# Patient Record
Sex: FEMALE | Race: BLACK OR AFRICAN AMERICAN | NOT HISPANIC OR LATINO | Employment: FULL TIME | URBAN - METROPOLITAN AREA
[De-identification: names, ages, dates, MRNs, and addresses within clinical notes are randomized per-mention and may not be internally consistent; named-entity substitution may affect disease eponyms.]

---

## 2023-09-22 DIAGNOSIS — N63.10 MASS OF RIGHT BREAST, UNSPECIFIED QUADRANT: Primary | ICD-10-CM

## 2023-09-28 ENCOUNTER — HOSPITAL ENCOUNTER (OUTPATIENT)
Dept: RADIOLOGY | Facility: HOSPITAL | Age: 33
Discharge: HOME | End: 2023-09-28
Attending: SURGERY
Payer: COMMERCIAL

## 2023-09-28 DIAGNOSIS — N63.10 MASS OF RIGHT BREAST, UNSPECIFIED QUADRANT: ICD-10-CM

## 2023-09-28 PROCEDURE — 76642 US BREAST RIGHT LIMITED: ICD-10-PCS | Mod: 26,RT,, | Performed by: STUDENT IN AN ORGANIZED HEALTH CARE EDUCATION/TRAINING PROGRAM

## 2023-09-28 PROCEDURE — 77061 BREAST TOMOSYNTHESIS UNI: CPT | Mod: 26,RT,, | Performed by: STUDENT IN AN ORGANIZED HEALTH CARE EDUCATION/TRAINING PROGRAM

## 2023-09-28 PROCEDURE — 77061 BREAST TOMOSYNTHESIS UNI: CPT | Mod: TC,RT

## 2023-09-28 PROCEDURE — 76642 ULTRASOUND BREAST LIMITED: CPT | Mod: 26,RT,, | Performed by: STUDENT IN AN ORGANIZED HEALTH CARE EDUCATION/TRAINING PROGRAM

## 2023-09-28 PROCEDURE — 77065 DX MAMMO INCL CAD UNI: CPT | Mod: 26,RT,, | Performed by: STUDENT IN AN ORGANIZED HEALTH CARE EDUCATION/TRAINING PROGRAM

## 2023-09-28 PROCEDURE — 77065 MAMMO DIGITAL DIAGNOSTIC RIGHT WITH TOMO: ICD-10-PCS | Mod: 26,RT,, | Performed by: STUDENT IN AN ORGANIZED HEALTH CARE EDUCATION/TRAINING PROGRAM

## 2023-09-28 PROCEDURE — 77061 MAMMO DIGITAL DIAGNOSTIC RIGHT WITH TOMO: ICD-10-PCS | Mod: 26,RT,, | Performed by: STUDENT IN AN ORGANIZED HEALTH CARE EDUCATION/TRAINING PROGRAM

## 2023-09-28 PROCEDURE — 76642 ULTRASOUND BREAST LIMITED: CPT | Mod: TC,RT

## 2023-10-05 PROBLEM — N63.15 UNSPECIFIED LUMP IN THE RIGHT BREAST, OVERLAPPING QUADRANTS: Status: ACTIVE | Noted: 2023-10-05

## 2023-10-06 ENCOUNTER — OFFICE VISIT (OUTPATIENT)
Dept: SURGERY | Facility: CLINIC | Age: 33
End: 2023-10-06
Payer: COMMERCIAL

## 2023-10-06 ENCOUNTER — TELEPHONE (OUTPATIENT)
Dept: SURGERY | Facility: CLINIC | Age: 33
End: 2023-10-06

## 2023-10-06 DIAGNOSIS — N63.0 MASS OF BREAST, UNSPECIFIED LATERALITY: ICD-10-CM

## 2023-10-06 DIAGNOSIS — N63.15 UNSPECIFIED LUMP IN THE RIGHT BREAST, OVERLAPPING QUADRANTS: ICD-10-CM

## 2023-10-06 PROCEDURE — 99999 PR PBB SHADOW E&M-EST. PATIENT-LVL II: ICD-10-PCS | Mod: PBBFAC,,, | Performed by: SURGERY

## 2023-10-06 PROCEDURE — 3044F PR MOST RECENT HEMOGLOBIN A1C LEVEL <7.0%: ICD-10-PCS | Mod: CPTII,S$GLB,, | Performed by: SURGERY

## 2023-10-06 PROCEDURE — 99203 PR OFFICE/OUTPT VISIT, NEW, LEVL III, 30-44 MIN: ICD-10-PCS | Mod: S$GLB,,, | Performed by: SURGERY

## 2023-10-06 PROCEDURE — 3044F HG A1C LEVEL LT 7.0%: CPT | Mod: CPTII,S$GLB,, | Performed by: SURGERY

## 2023-10-06 PROCEDURE — 99999 PR PBB SHADOW E&M-EST. PATIENT-LVL II: CPT | Mod: PBBFAC,,, | Performed by: SURGERY

## 2023-10-06 PROCEDURE — 99203 OFFICE O/P NEW LOW 30 MIN: CPT | Mod: S$GLB,,, | Performed by: SURGERY

## 2023-10-06 RX ORDER — DICLOXACILLIN SODIUM 500 MG/1
500 CAPSULE ORAL EVERY 6 HOURS
Qty: 40 CAPSULE | Refills: 0 | Status: SHIPPED | OUTPATIENT
Start: 2023-10-06 | End: 2023-10-16

## 2023-10-06 RX ORDER — CLINDAMYCIN HYDROCHLORIDE 300 MG/1
300 CAPSULE ORAL EVERY 6 HOURS
Qty: 28 CAPSULE | Refills: 0 | Status: SHIPPED | OUTPATIENT
Start: 2023-10-06 | End: 2023-10-13

## 2023-10-06 NOTE — TELEPHONE ENCOUNTER
Patient had called back to the office, and spoke with one of the phone operators to inform us that she was not able to  the Dicloxacillin that was sent in initially. I informed Dr Henry, and was told that she would send in Clindamycin for the patient. Phoned patient to make her aware, and informed to allow the pharmacy maybe an hour or so to fill. Voiced understanding.

## 2023-10-06 NOTE — TELEPHONE ENCOUNTER
Dr Henry ask that I reach out to [facility] [place] [facility] lact[facility] regarding the patient having mastitis. Patient was asked on exam by Dr Henry, if she has ever met with the lactation department, and the patient mentioned that she had the personal cellphone number of Patience Blas in that department. She mentioned that the called Patience on Wednesday of this week, but did not get an answer, and she was unable to leave a message. I was in the room with the patient, while she was on the phone with [facility]), and they mentioned to the patient to change her flange size; instead of pumping every 2 hours, change to pumping every 3 hours for 20 mins. Lactation was also informed by the patient that Dr Henry sent in a antibiotic to take every 6 hours for 10 days. Patient has been scheduled for a 2 week follow up with our office.

## 2023-10-06 NOTE — PROGRESS NOTES
Breast Surgical Oncology  [place]    Date of Service: 10/06/2023    SUBJECTIVE:   Chief complaint: right breast mass    HISTORY OF PRESENT ILLNESS:   Anabella Scott is a 33 y.o. female who is kindly referred by Dr. Leilani Olivarez for right breast mass.    She palpated multiple right breast abnormalities at her postpartum visit. She began to develop encouragement and diminished milk production to half an once per every 3 hour pumping of that right breast. Focused sonographic evaluation revealed an incidental heterogeneously hypoechoic mass measuring 4.4 x 2.3 cm at 9 o'clock 3 cm from the nipple separate from the reported palpable findings. Focused sonographic evaluation at the reported palpable sites showed dense breast tissue without evidence of mass. She denies breast concerns such as pain, nipple discharge, nipple retraction or lumps under the arm.  She denies prior breast surgery or biopsy.     Her breast cancer risk factor profile is as follows: Menarche at 16, Menopause at N/A.  She is . Age at first live birth was 33. Family history of cancer is as follows: mother with breast cancer at age 50, current age unknown; brother with bladder cancer at age 37, current age unknown.     FAMILY HISTORY:     Family History   Problem Relation Age of Onset    Hypertension Father     Hypertension Mother     Breast cancer Mother         PAST MEDICAL HISTORY:   No past medical history on file.    SURGICAL HISTORY:   No past surgical history on file.    SOCIAL HISTORY:     Social History     Tobacco Use    Smoking status: Never    Smokeless tobacco: Never   Substance Use Topics    Alcohol use: Not Currently    Drug use: Never        MEDICATIONS/ALLERGIES:     Current Outpatient Medications:     aspirin (ECOTRIN) 81 MG EC tablet, Take 81 mg by mouth once daily., Disp: , Rfl:     dicloxacillin (DYNAPEN) 500 MG capsule, Take 1 capsule (500 mg total) by mouth every 6 (six) hours. for 10 days, Disp: 40 capsule, Rfl: 0     estradioL (ESTRACE) 0.01 % (0.1 mg/gram) vaginal cream, Place 1 g vaginally once daily. (Patient not taking: Reported on 9/14/2023), Disp: 30 g, Rfl: 1    ferrous sulfate 325 (65 FE) MG EC tablet, Take 1 tablet (325 mg total) by mouth 2 (two) times daily. (Patient not taking: Reported on 9/14/2023), Disp: 60 tablet, Rfl: 10    prenatal 25/iron fum/folic/dha (PRENATAL-1 ORAL), Take by mouth., Disp: , Rfl:   Review of patient's allergies indicates:   Allergen Reactions    Fish containing products Hives       REVIEW OF SYSTEMS:   I have reviewed 12 systems, including 2 points per system. Pertinent reported positives are: night sweats    PHYSICAL EXAM:   General: The patient appears well and is in no acute distress.     Chaperon present for examination.   BREAST EXAM  No Asymmetry  Right:  - Mass: No, vague nodularity 5:00, 8 CMFN  - Skin change: acute mastitis with engorgement  - Nipple Discharge: No  - Nipple retraction: No  - Axillary LAD: No  Left:   - Mass: No  - Skin change: No  - Nipple Discharge: No  - Nipple retraction: No  - Axillary LAD: No    IMAGING:   Images were personally reviewed.     Results for orders placed during the hospital encounter of 09/28/23    Mammo Digital Diagnostic Right with Jose    Narrative  Result:  Mammo Digital Diagnostic Right with Jose  US Breast Right Limited    History:  Patient is 33 y.o. and is seen for diagnostic imaging for palpable lower right breast abnormalities in postpartum setting, currently breast feeding.    Films Compared:  None    Findings:  This procedure was performed using tomosynthesis. Computer-aided detection was utilized in the interpretation of this examination.  Mammo Digital Diagnostic Right with Jsoe  The right breast is extremely dense, which lowers the sensitivity of mammography. There is no evidence of suspicious masses, calcifications, or other abnormal findings in the right breast.    US Breast Right Limited  At site of patient's palpable concern  within the right breast at 5 o'clock 8 cm from the nipple, there is heterogeneously hypoechoic breast tissue without definite underlying mass.  No internal vascularity.    At site of patient's palpable concern within the right breast at 6 o'clock 8 cm from the nipple, there is similar heterogeneously hypoechoic breast tissue without definite underlying mass.  No internal vascularity.    Incidentally identified at 9 o'clock 3 cm from the nipple separate from sites of palpable concern, there is a heterogeneously hypoechoic mass measuring 4.4 x 2.3 cm.  Mass is smoothly marginated with parallel orientation and posterior enhancement, no internal vascularity.  Appearance of internal echos suggests complex fluid, possible complicated cyst.    Impression  Ill-defined hypoechoic tissue correlates with patient's palpable concerns in the lower right breast without definite associated mass, probably benign.    Incidental possible complicated cyst within the outer right breast, probably benign.    BI-RADS Category:  Overall: 3 - Probably Benign      Recommendation:  Short interval follow-up ultrasound is recommended in 3 months.  Follow-up may be obtained sooner if patient ceases breast feeding before that time or if growing palpable concern.      PATHOLOGY:   none    ASSESSMENT:     1. Mastitis, postpartum    2. Unspecified lump in the right breast, overlapping quadrants    3. Mass of breast, unspecified laterality          PLAN:     The cyst detected on recent sonographic evaluation was likely developing mastitis. She has acute right breast mastitis. She requires immediate evaluation by lactation services.  Diclocacillin PO was sent to her pharmacy.     She will return in 2 weeks for re-evaluation of her LIQ palpable abnormality once her acute mastitis has resolved.     I spent a total of 30 minutes on this visit. This includes face to face time and non-face to face time preparing to see the patient (eg, review of tests),  obtaining and/or reviewing separately obtained history, documenting clinical information in the electronic or other health record, independently interpreting results and communicating results to the patient/family/caregiver, or care coordinator.        Angie Henry M.D.

## 2023-10-09 ENCOUNTER — TELEPHONE (OUTPATIENT)
Dept: SURGERY | Facility: CLINIC | Age: 33
End: 2023-10-09
Payer: COMMERCIAL

## 2023-10-09 NOTE — TELEPHONE ENCOUNTER
Phoned patient, per Dr Henry, to follow up on 10/6 visit. Patient mentioned that she continues to have some intermittent pain, and she has started taking her antibiotics. I asked her if she followed the instructions of the lactation department at [facility], and she mentioned she did. Informed patient that I would reach out to the Lactation department to have them to call her. Patient informed to f/u if she had not received a phone call by tomorrow afternoon. Voiced understanding.

## 2023-10-11 ENCOUNTER — HOSPITAL ENCOUNTER (OUTPATIENT)
Facility: HOSPITAL | Age: 33
Discharge: HOME | End: 2023-10-11
Attending: SURGERY | Admitting: SURGERY
Payer: COMMERCIAL

## 2023-10-11 ENCOUNTER — CLINICAL SUPPORT (OUTPATIENT)
Dept: LACTATION | Facility: CLINIC | Age: 33
End: 2023-10-11
Payer: COMMERCIAL

## 2023-10-11 ENCOUNTER — ANESTHESIA (OUTPATIENT)
Dept: SURGERY | Facility: HOSPITAL | Age: 33
End: 2023-10-11
Payer: COMMERCIAL

## 2023-10-11 ENCOUNTER — ANESTHESIA EVENT (OUTPATIENT)
Dept: SURGERY | Facility: HOSPITAL | Age: 33
End: 2023-10-11
Payer: COMMERCIAL

## 2023-10-11 ENCOUNTER — OFFICE VISIT (OUTPATIENT)
Dept: SURGERY | Facility: CLINIC | Age: 33
End: 2023-10-11
Payer: COMMERCIAL

## 2023-10-11 VITALS
WEIGHT: 94.94 LBS | SYSTOLIC BLOOD PRESSURE: 122 MMHG | HEIGHT: 60 IN | BODY MASS INDEX: 18.64 KG/M2 | DIASTOLIC BLOOD PRESSURE: 76 MMHG | OXYGEN SATURATION: 98 % | TEMPERATURE: 97 F | HEART RATE: 88 BPM | RESPIRATION RATE: 20 BRPM

## 2023-10-11 VITALS — HEIGHT: 60 IN | BODY MASS INDEX: 18.74 KG/M2 | WEIGHT: 95.44 LBS

## 2023-10-11 DIAGNOSIS — R23.9 ALTERATION IN SKIN INTEGRITY RELATED TO SURGICAL INCISION: Primary | ICD-10-CM

## 2023-10-11 DIAGNOSIS — N63.15 UNSPECIFIED LUMP IN THE RIGHT BREAST, OVERLAPPING QUADRANTS: ICD-10-CM

## 2023-10-11 DIAGNOSIS — O91.13 ABSCESS OF RIGHT BREAST ASSOCIATED WITH LACTATION: Primary | ICD-10-CM

## 2023-10-11 DIAGNOSIS — L02.91 ABSCESS: Primary | ICD-10-CM

## 2023-10-11 DIAGNOSIS — N63.13 MASS OF LOWER OUTER QUADRANT OF RIGHT BREAST: ICD-10-CM

## 2023-10-11 DIAGNOSIS — R23.9 ALTERATION IN SKIN INTEGRITY RELATED TO SURGICAL INCISION: ICD-10-CM

## 2023-10-11 DIAGNOSIS — Z71.89 COUNSELING AND COORDINATION OF CARE: ICD-10-CM

## 2023-10-11 LAB
B-HCG UR QL: NEGATIVE
CTP QC/QA: YES

## 2023-10-11 PROCEDURE — 25000003 PHARM REV CODE 250: Performed by: NURSE ANESTHETIST, CERTIFIED REGISTERED

## 2023-10-11 PROCEDURE — 36000705 HC OR TIME LEV I EA ADD 15 MIN: Performed by: SURGERY

## 2023-10-11 PROCEDURE — 87075 CULTR BACTERIA EXCEPT BLOOD: CPT | Performed by: SURGERY

## 2023-10-11 PROCEDURE — 99203 OFFICE O/P NEW LOW 30 MIN: CPT | Mod: 57,S$GLB,, | Performed by: NURSE PRACTITIONER

## 2023-10-11 PROCEDURE — 99999 PR PBB SHADOW E&M-EST. PATIENT-LVL III: CPT | Mod: PBBFAC,,, | Performed by: NURSE PRACTITIONER

## 2023-10-11 PROCEDURE — 3008F PR BODY MASS INDEX (BMI) DOCUMENTED: ICD-10-PCS | Mod: CPTII,S$GLB,, | Performed by: NURSE PRACTITIONER

## 2023-10-11 PROCEDURE — 87077 CULTURE AEROBIC IDENTIFY: CPT | Performed by: SURGERY

## 2023-10-11 PROCEDURE — C1729 CATH, DRAINAGE: HCPCS | Performed by: SURGERY

## 2023-10-11 PROCEDURE — 63600175 PHARM REV CODE 636 W HCPCS: Performed by: NURSE ANESTHETIST, CERTIFIED REGISTERED

## 2023-10-11 PROCEDURE — 99999 PR PBB SHADOW E&M-EST. PATIENT-LVL II: CPT | Mod: PBBFAC,,,

## 2023-10-11 PROCEDURE — 1160F RVW MEDS BY RX/DR IN RCRD: CPT | Mod: CPTII,S$GLB,, | Performed by: NURSE PRACTITIONER

## 2023-10-11 PROCEDURE — 3044F HG A1C LEVEL LT 7.0%: CPT | Mod: CPTII,S$GLB,, | Performed by: NURSE PRACTITIONER

## 2023-10-11 PROCEDURE — 3044F PR MOST RECENT HEMOGLOBIN A1C LEVEL <7.0%: ICD-10-PCS | Mod: CPTII,S$GLB,, | Performed by: NURSE PRACTITIONER

## 2023-10-11 PROCEDURE — 19020 PR EXPLO/DRAIN BREAST ABSCESS: ICD-10-PCS | Mod: RT,,, | Performed by: SURGERY

## 2023-10-11 PROCEDURE — 81025 URINE PREGNANCY TEST: CPT | Performed by: SURGERY

## 2023-10-11 PROCEDURE — 71000015 HC POSTOP RECOV 1ST HR: Performed by: SURGERY

## 2023-10-11 PROCEDURE — 1159F PR MEDICATION LIST DOCUMENTED IN MEDICAL RECORD: ICD-10-PCS | Mod: CPTII,S$GLB,, | Performed by: NURSE PRACTITIONER

## 2023-10-11 PROCEDURE — 25000003 PHARM REV CODE 250: Performed by: SURGERY

## 2023-10-11 PROCEDURE — 71000033 HC RECOVERY, INTIAL HOUR: Performed by: SURGERY

## 2023-10-11 PROCEDURE — 76998 PR  ULTRASONIC GUIDANCE, INTRAOPERATIVE: ICD-10-PCS | Mod: 26,,, | Performed by: SURGERY

## 2023-10-11 PROCEDURE — 3008F BODY MASS INDEX DOCD: CPT | Mod: CPTII,S$GLB,, | Performed by: NURSE PRACTITIONER

## 2023-10-11 PROCEDURE — 37000009 HC ANESTHESIA EA ADD 15 MINS: Performed by: SURGERY

## 2023-10-11 PROCEDURE — 19020 MASTOTOMY EXPL DRG ABSC DP: CPT | Mod: RT,,, | Performed by: SURGERY

## 2023-10-11 PROCEDURE — 99203 PR OFFICE/OUTPT VISIT, NEW, LEVL III, 30-44 MIN: ICD-10-PCS | Mod: 57,S$GLB,, | Performed by: NURSE PRACTITIONER

## 2023-10-11 PROCEDURE — 87070 CULTURE OTHR SPECIMN AEROBIC: CPT | Performed by: SURGERY

## 2023-10-11 PROCEDURE — 99999 PR PBB SHADOW E&M-EST. PATIENT-LVL III: ICD-10-PCS | Mod: PBBFAC,,, | Performed by: NURSE PRACTITIONER

## 2023-10-11 PROCEDURE — 1160F PR REVIEW ALL MEDS BY PRESCRIBER/CLIN PHARMACIST DOCUMENTED: ICD-10-PCS | Mod: CPTII,S$GLB,, | Performed by: NURSE PRACTITIONER

## 2023-10-11 PROCEDURE — D9220A PRA ANESTHESIA: Mod: ,,, | Performed by: NURSE ANESTHETIST, CERTIFIED REGISTERED

## 2023-10-11 PROCEDURE — 87186 SC STD MICRODIL/AGAR DIL: CPT | Mod: 59 | Performed by: SURGERY

## 2023-10-11 PROCEDURE — 76998 US GUIDE INTRAOP: CPT | Mod: 26,,, | Performed by: SURGERY

## 2023-10-11 PROCEDURE — D9220A PRA ANESTHESIA: ICD-10-PCS | Mod: ,,, | Performed by: NURSE ANESTHETIST, CERTIFIED REGISTERED

## 2023-10-11 PROCEDURE — 1159F MED LIST DOCD IN RCRD: CPT | Mod: CPTII,S$GLB,, | Performed by: NURSE PRACTITIONER

## 2023-10-11 PROCEDURE — 36000704 HC OR TIME LEV I 1ST 15 MIN: Performed by: SURGERY

## 2023-10-11 PROCEDURE — 99999 PR PBB SHADOW E&M-EST. PATIENT-LVL II: ICD-10-PCS | Mod: PBBFAC,,,

## 2023-10-11 PROCEDURE — 37000008 HC ANESTHESIA 1ST 15 MINUTES: Performed by: SURGERY

## 2023-10-11 RX ORDER — OXYCODONE AND ACETAMINOPHEN 5; 325 MG/1; MG/1
1 TABLET ORAL
Status: DISCONTINUED | OUTPATIENT
Start: 2023-10-11 | End: 2023-10-11 | Stop reason: HOSPADM

## 2023-10-11 RX ORDER — HYDROCODONE BITARTRATE AND ACETAMINOPHEN 5; 325 MG/1; MG/1
1 TABLET ORAL EVERY 6 HOURS PRN
Qty: 20 TABLET | Refills: 0 | Status: SHIPPED | OUTPATIENT
Start: 2023-10-11

## 2023-10-11 RX ORDER — MIDAZOLAM HYDROCHLORIDE 1 MG/ML
INJECTION, SOLUTION INTRAMUSCULAR; INTRAVENOUS
Status: DISCONTINUED | OUTPATIENT
Start: 2023-10-11 | End: 2023-10-11

## 2023-10-11 RX ORDER — ONDANSETRON 8 MG/1
8 TABLET, ORALLY DISINTEGRATING ORAL EVERY 8 HOURS PRN
Qty: 10 TABLET | Refills: 0 | Status: SHIPPED | OUTPATIENT
Start: 2023-10-11

## 2023-10-11 RX ORDER — CEFAZOLIN SODIUM 2 G/50ML
2 SOLUTION INTRAVENOUS
Status: DISCONTINUED | OUTPATIENT
Start: 2023-10-11 | End: 2023-10-11 | Stop reason: HOSPADM

## 2023-10-11 RX ORDER — SODIUM CHLORIDE 9 MG/ML
INJECTION, SOLUTION INTRAVENOUS CONTINUOUS
Status: CANCELLED | OUTPATIENT
Start: 2023-10-11

## 2023-10-11 RX ORDER — BUPIVACAINE HYDROCHLORIDE 2.5 MG/ML
INJECTION, SOLUTION EPIDURAL; INFILTRATION; INTRACAUDAL
Status: DISCONTINUED
Start: 2023-10-11 | End: 2023-10-11 | Stop reason: WASHOUT

## 2023-10-11 RX ORDER — DEXMEDETOMIDINE HYDROCHLORIDE 100 UG/ML
INJECTION, SOLUTION INTRAVENOUS
Status: DISCONTINUED | OUTPATIENT
Start: 2023-10-11 | End: 2023-10-11

## 2023-10-11 RX ORDER — KETOROLAC TROMETHAMINE 30 MG/ML
INJECTION, SOLUTION INTRAMUSCULAR; INTRAVENOUS
Status: DISCONTINUED | OUTPATIENT
Start: 2023-10-11 | End: 2023-10-11

## 2023-10-11 RX ORDER — PROPOFOL 10 MG/ML
VIAL (ML) INTRAVENOUS
Status: DISCONTINUED | OUTPATIENT
Start: 2023-10-11 | End: 2023-10-11

## 2023-10-11 RX ORDER — SODIUM CHLORIDE 9 MG/ML
INJECTION, SOLUTION INTRAVENOUS CONTINUOUS
Status: DISCONTINUED | OUTPATIENT
Start: 2023-10-11 | End: 2023-10-11 | Stop reason: HOSPADM

## 2023-10-11 RX ORDER — ONDANSETRON 2 MG/ML
INJECTION INTRAMUSCULAR; INTRAVENOUS
Status: DISCONTINUED | OUTPATIENT
Start: 2023-10-11 | End: 2023-10-11

## 2023-10-11 RX ORDER — IBUPROFEN 600 MG/1
TABLET ORAL
COMMUNITY
Start: 2023-08-28

## 2023-10-11 RX ORDER — FENTANYL CITRATE 50 UG/ML
25 INJECTION, SOLUTION INTRAMUSCULAR; INTRAVENOUS EVERY 5 MIN PRN
Status: DISCONTINUED | OUTPATIENT
Start: 2023-10-11 | End: 2023-10-11 | Stop reason: HOSPADM

## 2023-10-11 RX ORDER — LIDOCAINE HYDROCHLORIDE 10 MG/ML
INJECTION, SOLUTION EPIDURAL; INFILTRATION; INTRACAUDAL; PERINEURAL
Status: DISCONTINUED
Start: 2023-10-11 | End: 2023-10-11 | Stop reason: HOSPADM

## 2023-10-11 RX ORDER — LIDOCAINE HYDROCHLORIDE 20 MG/ML
INJECTION, SOLUTION EPIDURAL; INFILTRATION; INTRACAUDAL; PERINEURAL
Status: DISCONTINUED | OUTPATIENT
Start: 2023-10-11 | End: 2023-10-11

## 2023-10-11 RX ORDER — FENTANYL CITRATE 50 UG/ML
INJECTION, SOLUTION INTRAMUSCULAR; INTRAVENOUS
Status: DISCONTINUED | OUTPATIENT
Start: 2023-10-11 | End: 2023-10-11

## 2023-10-11 RX ORDER — LIDOCAINE HYDROCHLORIDE 10 MG/ML
INJECTION, SOLUTION EPIDURAL; INFILTRATION; INTRACAUDAL; PERINEURAL
Status: DISCONTINUED | OUTPATIENT
Start: 2023-10-11 | End: 2023-10-11 | Stop reason: HOSPADM

## 2023-10-11 RX ORDER — OXYCODONE HYDROCHLORIDE 5 MG/1
TABLET ORAL
Status: ON HOLD | COMMUNITY
Start: 2023-08-28 | End: 2023-10-11 | Stop reason: HOSPADM

## 2023-10-11 RX ORDER — ONDANSETRON 2 MG/ML
4 INJECTION INTRAMUSCULAR; INTRAVENOUS DAILY PRN
Status: DISCONTINUED | OUTPATIENT
Start: 2023-10-11 | End: 2023-10-11 | Stop reason: HOSPADM

## 2023-10-11 RX ORDER — LIDOCAINE HYDROCHLORIDE 10 MG/ML
INJECTION, SOLUTION EPIDURAL; INFILTRATION; INTRACAUDAL; PERINEURAL
Status: DISCONTINUED
Start: 2023-10-11 | End: 2023-10-11 | Stop reason: WASHOUT

## 2023-10-11 RX ORDER — MEPERIDINE HYDROCHLORIDE 25 MG/ML
12.5 INJECTION INTRAMUSCULAR; INTRAVENOUS; SUBCUTANEOUS ONCE AS NEEDED
Status: DISCONTINUED | OUTPATIENT
Start: 2023-10-11 | End: 2023-10-11 | Stop reason: HOSPADM

## 2023-10-11 RX ADMIN — ONDANSETRON 4 MG: 2 INJECTION INTRAMUSCULAR; INTRAVENOUS at 04:10

## 2023-10-11 RX ADMIN — PROPOFOL 50 MG: 10 INJECTION, EMULSION INTRAVENOUS at 04:10

## 2023-10-11 RX ADMIN — DEXMEDETOMIDINE HYDROCHLORIDE 4 MCG: 100 INJECTION, SOLUTION INTRAVENOUS at 04:10

## 2023-10-11 RX ADMIN — MIDAZOLAM 1 MG: 1 INJECTION INTRAMUSCULAR; INTRAVENOUS at 03:10

## 2023-10-11 RX ADMIN — FENTANYL CITRATE 25 MCG: 50 INJECTION, SOLUTION INTRAMUSCULAR; INTRAVENOUS at 04:10

## 2023-10-11 RX ADMIN — PROPOFOL 40 MG: 10 INJECTION, EMULSION INTRAVENOUS at 04:10

## 2023-10-11 RX ADMIN — KETOROLAC TROMETHAMINE 30 MG: 30 INJECTION, SOLUTION INTRAMUSCULAR; INTRAVENOUS at 04:10

## 2023-10-11 RX ADMIN — LIDOCAINE HYDROCHLORIDE 50 MG: 20 INJECTION, SOLUTION EPIDURAL; INFILTRATION; INTRACAUDAL; PERINEURAL at 04:10

## 2023-10-11 RX ADMIN — PROPOFOL 60 MG: 10 INJECTION, EMULSION INTRAVENOUS at 04:10

## 2023-10-11 NOTE — OP NOTE
Operative Report    Preoperative Diagnosis:   Right Breast Abscess    Postoperative Diagnosis:  Same    Procedures Performed:  Incision and Drainage Right Breast Abscess      Surgeon: Angie Henry MD    Anesthesia: General    Drains: None    Estimated Blood Loss: <30    Complications: None apparent    Disposition: PACU in good condition    Intra-operative Ultrasound Procedure and Findings:   Focused sonography of the upper outer quadrant quadrant of the right breast was performed, identifying the collection.  The extent of the collection was marked on the skin.     Specimens:  Cultures    Statement of Medical Necessity:  This patient is a 33 year old woman who was recently diagnosed with a right breast abscess requiring operative drainage. The risks, benefits and alternatives to surgery have been discussed and she has provided informed consent.     Description of Operative Procedures and Findings:  The patient was identified and transported to the operating room and placed on the operating table.  All pressure points were padded.  General Anesthesia was administered. Intraoperative ultrasound was performed, as described above.  The patient's right breast was prepped and draped in the usual sterile fashion.  A time out was performed.    A periareolar incision was made over the area of maximal fluctuance using the 15 blade scalpel.  The abscess cavity was encountered and loculations were disrupted digitally.  Copious amounts of purulence was drained (approximately 300 ccs).  The cavity was irrigated with saline solution.  Hemostasis was obtained. A karon clamp was used to break any remaining loculations. Because of the size of the cavity, a counter incision was made at 10:00 radian, 15 cm from the nipple. A penrose was looped through both incisions and secured with silk suture. The wounds were dressed with fluffs and abdominal pad. She was placed in a compressive bra. The instrument, needle and sponge counts were  reported to be correct.  The patient was awoken from anesthesia and transported to the PACU in good condition.  No complications were noted. I was present for and performed the entire operation.

## 2023-10-11 NOTE — ANESTHESIA PREPROCEDURE EVALUATION
10/11/2023  Anabella Scott is a 33 y.o., female.      Pre-op Assessment    I have reviewed the Patient Summary Reports.    I have reviewed the NPO Status.   I have reviewed the Medications.     Review of Systems      Physical Exam  General: Well nourished    Airway:  Mallampati: II   Mouth Opening: Normal  TM Distance: Normal  Tongue: Normal  Neck ROM: Normal ROM    Dental:  Intact        Anesthesia Plan  Type of Anesthesia, risks & benefits discussed:    Anesthesia Type: Gen ETT  Intra-op Monitoring Plan: Standard ASA Monitors  Post Op Pain Control Plan: multimodal analgesia and IV/PO Opioids PRN  Induction:  IV  Informed Consent: Informed consent signed with the Patient and all parties understand the risks and agree with anesthesia plan.  All questions answered. Patient consented to blood products? No  ASA Score: 2 Emergent  Day of Surgery Review of History & Physical: H&P Update referred to the surgeon/provider.    Ready For Surgery From Anesthesia Perspective.     .

## 2023-10-11 NOTE — INTERVAL H&P NOTE
The patient has been examined and the H&P has been reviewed:    I concur with the findings and no changes have occurred since H&P was written.    Surgery risks, benefits and alternative options discussed and understood by patient/family.          Active Hospital Problems    Diagnosis  POA    *Abscess of right breast associated with lactation [O91.13]  Yes      Resolved Hospital Problems   No resolved problems to display.

## 2023-10-11 NOTE — PROGRESS NOTES
Breast Surgical Oncology  [place]    Date of Service: 10/11/2023    SUBJECTIVE:   Chief complaint: right breast mass    HISTORY OF PRESENT ILLNESS:   Anabella Scott is a 33 y.o. female who is kindly referred by Dr. Leilani Olivarez for right breast mass.    Pt delivered 2023    Pt has a history of right breast mastitis recently- was placed on oral clindamycin by Dr. Angie Henry 10/6/2023 for treatment. Pt was referred to lactation specialist for evaluation- had appt for today- specialist notified Dr. Henry that the right breast was red and there was a pronounced mass.     Pt denies any fever. States has been taking oral antibiotics as directed. Unsure when redness started and increase in swelling of the right breast.     Pt states painful only when the area is palpated    PSH: pt denies any prior history of surgery    PMH: denies medical problems- no htn or bleeding problems    Meds: clindamycin 300 mg tid    Per Dr. Henry's note on 10/6/2023  She palpated multiple right breast abnormalities at her postpartum visit. She began to develop  diminished milk production to half an once per every 3 hour pumping of that right breast. Focused sonographic evaluation revealed an incidental heterogeneously hypoechoic mass measuring 4.4 x 2.3 cm at 9 o'clock 3 cm from the nipple separate from the reported palpable findings. Focused sonographic evaluation at the reported palpable sites showed dense breast tissue without evidence of mass. She denies breast concerns such as pain, nipple discharge, nipple retraction or lumps under the arm.  She denies prior breast surgery or biopsy.     Her breast cancer risk factor profile is as follows: Menarche at 16, Menopause at N/A.  She is . Age at first live birth was 33. Family history of cancer is as follows: mother with breast cancer at age 50, current age unknown; brother with bladder cancer at age 37, current age unknown.     FAMILY HISTORY:     Family History    Problem Relation Age of Onset    Hypertension Father     Hypertension Mother     Breast cancer Mother         PAST MEDICAL HISTORY:   No past medical history on file.    SURGICAL HISTORY:   No past surgical history on file.    SOCIAL HISTORY:     Social History     Tobacco Use    Smoking status: Never    Smokeless tobacco: Never   Substance Use Topics    Alcohol use: Not Currently    Drug use: Never        MEDICATIONS/ALLERGIES:     Current Outpatient Medications:     aspirin (ECOTRIN) 81 MG EC tablet, Take 81 mg by mouth once daily., Disp: , Rfl:     clindamycin (CLEOCIN) 300 MG capsule, Take 1 capsule (300 mg total) by mouth every 6 (six) hours. for 7 days, Disp: 28 capsule, Rfl: 0    dicloxacillin (DYNAPEN) 500 MG capsule, Take 1 capsule (500 mg total) by mouth every 6 (six) hours. for 10 days, Disp: 40 capsule, Rfl: 0    estradioL (ESTRACE) 0.01 % (0.1 mg/gram) vaginal cream, Place 1 g vaginally once daily., Disp: 30 g, Rfl: 1    ferrous sulfate 325 (65 FE) MG EC tablet, Take 1 tablet (325 mg total) by mouth 2 (two) times daily., Disp: 60 tablet, Rfl: 10    ibuprofen (ADVIL,MOTRIN) 600 MG tablet, , Disp: , Rfl:     oxyCODONE (ROXICODONE) 5 MG immediate release tablet, , Disp: , Rfl:     prenatal 25/iron fum/folic/dha (PRENATAL-1 ORAL), Take by mouth., Disp: , Rfl:   Review of patient's allergies indicates:   Allergen Reactions    Fish containing products Hives       REVIEW OF SYSTEMS:   I have reviewed 12 systems, including 2 points per system. Pertinent reported positives are: right breast swelling and redness as noted in HPI  Review of Systems   Constitutional: Negative.    HENT: Negative.    Eyes: Negative.    Respiratory: Negative.    Cardiovascular: Negative.    Gastrointestinal: Negative.    Endocrine: Negative.    Genitourinary: Negative.    Musculoskeletal: Negative.    Integumentary:  Negative.   Allergic/Immunologic: Negative.    Neurological: Negative.    Hematological: Negative.  Negative for  adenopathy.   Psychiatric/Behavioral: Negative.    Breast: as noted in HPI- right breast mass and redness        PHYSICAL EXAM:   General: The patient appears well and is in no acute distress.        Physical Exam  Constitutional:       Appearance: She is well-developed.   HENT:      Head: Normocephalic and atraumatic.      Right Ear: External ear normal.      Left Ear: External ear normal.      Mouth/Throat:      Pharynx: No oropharyngeal exudate.   Eyes:      General: No scleral icterus.        Right eye: No discharge.         Left eye: No discharge.      Conjunctiva/sclera: Conjunctivae normal.      Pupils: Pupils are equal, round, and reactive to light.   Neck:      Thyroid: No thyromegaly.   Cardiovascular:      Rate and Rhythm: Normal rate and regular rhythm.      Heart sounds: Normal heart sounds.   Pulmonary:      Effort: Pulmonary effort is normal.      Breath sounds: Normal breath sounds.   Chest:      Breasts:         Right: No inverted nipple, + 8 cm mass- visible redness and warmth and fluctuation over mass, nipple discharge, skin change or tenderness.         Left: No inverted nipple, mass, nipple discharge, skin change or tenderness.   Abdominal:      General: Bowel sounds are normal.      Palpations: Abdomen is soft.   Musculoskeletal:         General: Normal range of motion.      Cervical back: Normal range of motion and neck supple.   Lymphadenopathy:      Head:      Right side of head: No submental, submandibular, tonsillar, preauricular, posterior auricular or occipital adenopathy.      Left side of head: No submental, submandibular, tonsillar, preauricular, posterior auricular or occipital adenopathy.      Cervical: No cervical adenopathy.      Right cervical: No superficial or posterior cervical adenopathy.     Left cervical: No superficial or posterior cervical adenopathy.      Upper Body:      Right upper body: No supraclavicular or axillary adenopathy.      Left upper body: No  supraclavicular or axillary adenopathy.   Skin:     General: Skin is warm and dry.      Coloration: Skin is not pale.      Findings: right breast redness and swelling.   Neurological:      Mental Status: She is alert and oriented to person, place, and time.      .   Psychiatric:         Behavior: Behavior normal.         Thought Content: Thought content normal.         Judgment: Judgment normal.          BREAST EXAM  No Asymmetry  Right:  - Mass: visible right breast redness and 8 cm fluctuant mass laterally.   - Skin change: redness lateral right breast  - Nipple Discharge: No  - Nipple retraction: No  - Axillary LAD: No  Left:   - Mass: No  - Skin change: No  - Nipple Discharge: No  - Nipple retraction: No  - Axillary LAD: No    IMAGING:       Results for orders placed during the hospital encounter of 09/28/23    Mammo Digital Diagnostic Right with Jose    Narrative  Result:  Mammo Digital Diagnostic Right with Jose  US Breast Right Limited    History:  Patient is 33 y.o. and is seen for diagnostic imaging for palpable lower right breast abnormalities in postpartum setting, currently breast feeding.    Films Compared:  None    Findings:  This procedure was performed using tomosynthesis. Computer-aided detection was utilized in the interpretation of this examination.  Mammo Digital Diagnostic Right with Jose  The right breast is extremely dense, which lowers the sensitivity of mammography. There is no evidence of suspicious masses, calcifications, or other abnormal findings in the right breast.    US Breast Right Limited  At site of patient's palpable concern within the right breast at 5 o'clock 8 cm from the nipple, there is heterogeneously hypoechoic breast tissue without definite underlying mass.  No internal vascularity.    At site of patient's palpable concern within the right breast at 6 o'clock 8 cm from the nipple, there is similar heterogeneously hypoechoic breast tissue without definite underlying mass.   No internal vascularity.    Incidentally identified at 9 o'clock 3 cm from the nipple separate from sites of palpable concern, there is a heterogeneously hypoechoic mass measuring 4.4 x 2.3 cm.  Mass is smoothly marginated with parallel orientation and posterior enhancement, no internal vascularity.  Appearance of internal echos suggests complex fluid, possible complicated cyst.    Impression  Ill-defined hypoechoic tissue correlates with patient's palpable concerns in the lower right breast without definite associated mass, probably benign.    Incidental possible complicated cyst within the outer right breast, probably benign.    BI-RADS Category:  Overall: 3 - Probably Benign      Recommendation:  Short interval follow-up ultrasound is recommended in 3 months.  Follow-up may be obtained sooner if patient ceases breast feeding before that time or if growing palpable concern.      PATHOLOGY:   none    ASSESSMENT:     1. Abscess    2. Mass of lower outer quadrant of right breast    3. Counseling and coordination of care            PLAN:     Dr. Henry is aware of pt's findings and is planning to do incision and drainage today at the [facility]    Explained procedure to pt - consent to be signed and reviewed with Dr. Henry in preop. pt has arranged for someone to take her home after the procedure      JANINA Mina

## 2023-10-11 NOTE — ANESTHESIA POSTPROCEDURE EVALUATION
Anesthesia Post Evaluation    Patient: Anabella Scott    Procedure(s) Performed: Procedure(s) (LRB):  INCISION AND DRAINAGE, BREAST (Right)    Final Anesthesia Type: general      Patient location during evaluation: PACU  Patient participation: Yes- Able to Participate  Level of consciousness: awake  Post-procedure vital signs: reviewed and stable  Pain management: adequate  Airway patency: patent    PONV status at discharge: No PONV  Anesthetic complications: no      Cardiovascular status: stable  Respiratory status: unassisted  Hydration status: euvolemic  Follow-up not needed.          Vitals Value Taken Time   /78 10/11/23 1642   Temp 36.2 °C (97.2 °F) 10/11/23 1634   Pulse 94 10/11/23 1647   Resp 17 10/11/23 1647   SpO2 98 % 10/11/23 1647   Vitals shown include unvalidated device data.      No case tracking events are documented in the log.      Pain/Eliseo Score: Eliseo Score: 10 (10/11/2023  4:34 PM)

## 2023-10-11 NOTE — PROGRESS NOTES
Patient presents, provider referral (Dr. Henry) for mastitis. Currently taking clindamycin started last Friday.     2023 (1.5 months pp)   Complicated by IUGR, dr. Olivarez, [facility]   , IOL for IUGR 38W 2D uncomplicated birth      Exclusively pumping   Medela   Double pumping  24mm   Every 3 hours   20 min   Mastitis side (right side) 0.5oz , left breast 2.5oz  Prior to mastitis right side was producing about 1.5oz     Symptoms for mastitis started Monday 10/2 pain and redness to right breast only, no fever. Symptoms did not resolve and started abx Friday 10/6      Upon examination noted   Physical Exam   Pulmonary/Chest: Right breast exhibits skin change and tenderness. There is breast swelling. Breasts are asymmetrical.       Genitourinary: There is breast swelling.       Redness with skin change noted, generalize edema in right breast. Area of redness with taut skin, peeling, boggy at center.     Concern for abscess in right breast.   Contacted Dr. Henry, reported assessment and requested either patient to be seen in clinic or if patient should report to ER/assessment center, reported concern that patient needed to be seen today.   MD instructed patient to proceed to 4th floor ([place]) and NP would assess patient.   Patient to follow up, plan of care pending MD assessment.

## 2023-10-11 NOTE — LETTER
October 11, 2023         [address]  [place] [address]  Phone: [phone]  Fax: [phone]       Patient: Anabella Scott   YOB: 1990  Date of Visit: 10/11/2023    To Whom It May Concern:    [place] Scott  was at [facility] on 10/11/2023. The patient may return to work/school on November 16 2023. If you have any questions or concerns, or if I can be of further assistance, please do not hesitate to contact me.    Sincerely,    Therese Patten RN

## 2023-10-11 NOTE — DISCHARGE SUMMARY
[facility] Peri Services  Discharge Note  Short Stay    Procedure(s) (LRB):  INCISION AND DRAINAGE, BREAST (Right)      OUTCOME: Patient tolerated treatment/procedure well without complication and is now ready for discharge.    DISPOSITION: Home or Self Care    FINAL DIAGNOSIS:  Abscess of right breast associated with lactation    FOLLOWUP: In clinic    DISCHARGE INSTRUCTIONS:    Discharge Procedure Orders   Diet Adult Regular     Notify your health care provider if you experience any of the following:  temperature >100.4     Notify your health care provider if you experience any of the following:  persistent nausea and vomiting or diarrhea     Notify your health care provider if you experience any of the following:  severe uncontrolled pain     Notify your health care provider if you experience any of the following:  redness, tenderness, or signs of infection (pain, swelling, redness, odor or green/yellow discharge around incision site)     Change dressing (specify)   Order Comments: Dressing change: 3 times per day using gauze or maxi pad as needed or prn saturation.     Activity as tolerated        TIME SPENT ON DISCHARGE: 15 minutes

## 2023-10-11 NOTE — H&P (VIEW-ONLY)
Breast Surgical Oncology  [place]    Date of Service: 10/11/2023    SUBJECTIVE:   Chief complaint: right breast mass    HISTORY OF PRESENT ILLNESS:   Anabella Scott is a 33 y.o. female who is kindly referred by Dr. Leilani Olivarez for right breast mass.    Pt delivered 2023    Pt has a history of right breast mastitis recently- was placed on oral clindamycin by Dr. Angie Henry 10/6/2023 for treatment. Pt was referred to lactation specialist for evaluation- had appt for today- specialist notified Dr. Henry that the right breast was red and there was a pronounced mass.     Pt denies any fever. States has been taking oral antibiotics as directed. Unsure when redness started and increase in swelling of the right breast.     Pt states painful only when the area is palpated    PSH: pt denies any prior history of surgery    PMH: denies medical problems- no htn or bleeding problems    Meds: clindamycin 300 mg tid    Per Dr. Henry's note on 10/6/2023  She palpated multiple right breast abnormalities at her postpartum visit. She began to develop  diminished milk production to half an once per every 3 hour pumping of that right breast. Focused sonographic evaluation revealed an incidental heterogeneously hypoechoic mass measuring 4.4 x 2.3 cm at 9 o'clock 3 cm from the nipple separate from the reported palpable findings. Focused sonographic evaluation at the reported palpable sites showed dense breast tissue without evidence of mass. She denies breast concerns such as pain, nipple discharge, nipple retraction or lumps under the arm.  She denies prior breast surgery or biopsy.     Her breast cancer risk factor profile is as follows: Menarche at 16, Menopause at N/A.  She is . Age at first live birth was 33. Family history of cancer is as follows: mother with breast cancer at age 50, current age unknown; brother with bladder cancer at age 37, current age unknown.     FAMILY HISTORY:     Family History    Problem Relation Age of Onset    Hypertension Father     Hypertension Mother     Breast cancer Mother         PAST MEDICAL HISTORY:   No past medical history on file.    SURGICAL HISTORY:   No past surgical history on file.    SOCIAL HISTORY:     Social History     Tobacco Use    Smoking status: Never    Smokeless tobacco: Never   Substance Use Topics    Alcohol use: Not Currently    Drug use: Never        MEDICATIONS/ALLERGIES:     Current Outpatient Medications:     aspirin (ECOTRIN) 81 MG EC tablet, Take 81 mg by mouth once daily., Disp: , Rfl:     clindamycin (CLEOCIN) 300 MG capsule, Take 1 capsule (300 mg total) by mouth every 6 (six) hours. for 7 days, Disp: 28 capsule, Rfl: 0    dicloxacillin (DYNAPEN) 500 MG capsule, Take 1 capsule (500 mg total) by mouth every 6 (six) hours. for 10 days, Disp: 40 capsule, Rfl: 0    estradioL (ESTRACE) 0.01 % (0.1 mg/gram) vaginal cream, Place 1 g vaginally once daily., Disp: 30 g, Rfl: 1    ferrous sulfate 325 (65 FE) MG EC tablet, Take 1 tablet (325 mg total) by mouth 2 (two) times daily., Disp: 60 tablet, Rfl: 10    ibuprofen (ADVIL,MOTRIN) 600 MG tablet, , Disp: , Rfl:     oxyCODONE (ROXICODONE) 5 MG immediate release tablet, , Disp: , Rfl:     prenatal 25/iron fum/folic/dha (PRENATAL-1 ORAL), Take by mouth., Disp: , Rfl:   Review of patient's allergies indicates:   Allergen Reactions    Fish containing products Hives       REVIEW OF SYSTEMS:   I have reviewed 12 systems, including 2 points per system. Pertinent reported positives are: right breast swelling and redness as noted in HPI  Review of Systems   Constitutional: Negative.    HENT: Negative.    Eyes: Negative.    Respiratory: Negative.    Cardiovascular: Negative.    Gastrointestinal: Negative.    Endocrine: Negative.    Genitourinary: Negative.    Musculoskeletal: Negative.    Integumentary:  Negative.   Allergic/Immunologic: Negative.    Neurological: Negative.    Hematological: Negative.  Negative for  adenopathy.   Psychiatric/Behavioral: Negative.    Breast: as noted in HPI- right breast mass and redness        PHYSICAL EXAM:   General: The patient appears well and is in no acute distress.        Physical Exam  Constitutional:       Appearance: She is well-developed.   HENT:      Head: Normocephalic and atraumatic.      Right Ear: External ear normal.      Left Ear: External ear normal.      Mouth/Throat:      Pharynx: No oropharyngeal exudate.   Eyes:      General: No scleral icterus.        Right eye: No discharge.         Left eye: No discharge.      Conjunctiva/sclera: Conjunctivae normal.      Pupils: Pupils are equal, round, and reactive to light.   Neck:      Thyroid: No thyromegaly.   Cardiovascular:      Rate and Rhythm: Normal rate and regular rhythm.      Heart sounds: Normal heart sounds.   Pulmonary:      Effort: Pulmonary effort is normal.      Breath sounds: Normal breath sounds.   Chest:      Breasts:         Right: No inverted nipple, + 8 cm mass- visible redness and warmth and fluctuation over mass, nipple discharge, skin change or tenderness.         Left: No inverted nipple, mass, nipple discharge, skin change or tenderness.   Abdominal:      General: Bowel sounds are normal.      Palpations: Abdomen is soft.   Musculoskeletal:         General: Normal range of motion.      Cervical back: Normal range of motion and neck supple.   Lymphadenopathy:      Head:      Right side of head: No submental, submandibular, tonsillar, preauricular, posterior auricular or occipital adenopathy.      Left side of head: No submental, submandibular, tonsillar, preauricular, posterior auricular or occipital adenopathy.      Cervical: No cervical adenopathy.      Right cervical: No superficial or posterior cervical adenopathy.     Left cervical: No superficial or posterior cervical adenopathy.      Upper Body:      Right upper body: No supraclavicular or axillary adenopathy.      Left upper body: No  supraclavicular or axillary adenopathy.   Skin:     General: Skin is warm and dry.      Coloration: Skin is not pale.      Findings: right breast redness and swelling.   Neurological:      Mental Status: She is alert and oriented to person, place, and time.      .   Psychiatric:         Behavior: Behavior normal.         Thought Content: Thought content normal.         Judgment: Judgment normal.          BREAST EXAM  No Asymmetry  Right:  - Mass: visible right breast redness and 8 cm fluctuant mass laterally.   - Skin change: redness lateral right breast  - Nipple Discharge: No  - Nipple retraction: No  - Axillary LAD: No  Left:   - Mass: No  - Skin change: No  - Nipple Discharge: No  - Nipple retraction: No  - Axillary LAD: No    IMAGING:       Results for orders placed during the hospital encounter of 09/28/23    Mammo Digital Diagnostic Right with Jose    Narrative  Result:  Mammo Digital Diagnostic Right with Jose  US Breast Right Limited    History:  Patient is 33 y.o. and is seen for diagnostic imaging for palpable lower right breast abnormalities in postpartum setting, currently breast feeding.    Films Compared:  None    Findings:  This procedure was performed using tomosynthesis. Computer-aided detection was utilized in the interpretation of this examination.  Mammo Digital Diagnostic Right with Jose  The right breast is extremely dense, which lowers the sensitivity of mammography. There is no evidence of suspicious masses, calcifications, or other abnormal findings in the right breast.    US Breast Right Limited  At site of patient's palpable concern within the right breast at 5 o'clock 8 cm from the nipple, there is heterogeneously hypoechoic breast tissue without definite underlying mass.  No internal vascularity.    At site of patient's palpable concern within the right breast at 6 o'clock 8 cm from the nipple, there is similar heterogeneously hypoechoic breast tissue without definite underlying mass.   No internal vascularity.    Incidentally identified at 9 o'clock 3 cm from the nipple separate from sites of palpable concern, there is a heterogeneously hypoechoic mass measuring 4.4 x 2.3 cm.  Mass is smoothly marginated with parallel orientation and posterior enhancement, no internal vascularity.  Appearance of internal echos suggests complex fluid, possible complicated cyst.    Impression  Ill-defined hypoechoic tissue correlates with patient's palpable concerns in the lower right breast without definite associated mass, probably benign.    Incidental possible complicated cyst within the outer right breast, probably benign.    BI-RADS Category:  Overall: 3 - Probably Benign      Recommendation:  Short interval follow-up ultrasound is recommended in 3 months.  Follow-up may be obtained sooner if patient ceases breast feeding before that time or if growing palpable concern.      PATHOLOGY:   none    ASSESSMENT:     1. Abscess    2. Mass of lower outer quadrant of right breast    3. Counseling and coordination of care            PLAN:     Dr. Henry is aware of pt's findings and is planning to do incision and drainage today at the [facility]    Explained procedure to pt - consent to be signed and reviewed with Dr. Henry in preop. pt has arranged for someone to take her home after the procedure      JANINA Mina

## 2023-10-11 NOTE — TRANSFER OF CARE
Anesthesia Transfer of Care Note    Patient: Anabella Scott    Procedure(s) Performed: Procedure(s) (LRB):  INCISION AND DRAINAGE, BREAST (Right)    Patient location: PACU    Anesthesia Type: general    Transport from OR: Transported from OR on room air with adequate spontaneous ventilation    Post pain: adequate analgesia    Post assessment: no apparent anesthetic complications and tolerated procedure well    Post vital signs: stable    Level of consciousness: awake    Nausea/Vomiting: no nausea/vomiting    Complications: none    Transfer of care protocol was followed      Last vitals:   Visit Vitals  /78 (BP Location: Right arm, Patient Position: Sitting)   Pulse (!) 114   Temp 36.3 °C (97.4 °F) (Tympanic)   Resp 17   Ht 5' (1.524 m)   Wt 43.1 kg (94 lb 14.5 oz)   LMP  (LMP Unknown)   SpO2 97%   Breastfeeding Yes   BMI 18.54 kg/m²

## 2023-10-11 NOTE — PLAN OF CARE
Patient does not need preop labs drawn. Ok to d/c orders for labs per Dr. Henry and Dr. Matthews. Pt notified to come directly to surgery center when finished at NP office.

## 2023-10-13 ENCOUNTER — OFFICE VISIT (OUTPATIENT)
Dept: SURGERY | Facility: CLINIC | Age: 33
End: 2023-10-13
Payer: COMMERCIAL

## 2023-10-13 DIAGNOSIS — O91.13 ABSCESS OF RIGHT BREAST ASSOCIATED WITH LACTATION: Primary | ICD-10-CM

## 2023-10-13 PROCEDURE — 3044F HG A1C LEVEL LT 7.0%: CPT | Mod: CPTII,S$GLB,, | Performed by: SURGERY

## 2023-10-13 PROCEDURE — 99024 PR POST-OP FOLLOW-UP VISIT: ICD-10-PCS | Mod: S$GLB,,, | Performed by: SURGERY

## 2023-10-13 PROCEDURE — 99024 POSTOP FOLLOW-UP VISIT: CPT | Mod: S$GLB,,, | Performed by: SURGERY

## 2023-10-13 PROCEDURE — 3044F PR MOST RECENT HEMOGLOBIN A1C LEVEL <7.0%: ICD-10-PCS | Mod: CPTII,S$GLB,, | Performed by: SURGERY

## 2023-10-13 NOTE — PROGRESS NOTES
Breast Surgical Oncology  Post-operative Visit      REFERRING PHYSICIAN:  Mary Primary Doctor    Date of Service: 10/13/2023    DIAGNOSIS:   This is a 33 y.o. female with right breast abscess    TREATMENT SUMMARY:   The patient is status post right mastotomy and drainage of abscess on 10/11/23.     INTERVAL HISTORY:   Anabella Scott comes in for a post-op check.  She denies fever, chills, chest pain or shortness of breath.  Her pain is well controlled.  Penrose drain is in place. She reports saturation of gauze twice to three times daily.     MEDICATIONS:     Current Outpatient Medications   Medication Sig Dispense Refill    aspirin (ECOTRIN) 81 MG EC tablet Take 81 mg by mouth once daily.      clindamycin (CLEOCIN) 300 MG capsule Take 1 capsule (300 mg total) by mouth every 6 (six) hours. for 7 days 28 capsule 0    dicloxacillin (DYNAPEN) 500 MG capsule Take 1 capsule (500 mg total) by mouth every 6 (six) hours. for 10 days 40 capsule 0    estradioL (ESTRACE) 0.01 % (0.1 mg/gram) vaginal cream Place 1 g vaginally once daily. 30 g 1    ferrous sulfate 325 (65 FE) MG EC tablet Take 1 tablet (325 mg total) by mouth 2 (two) times daily. 60 tablet 10    HYDROcodone-acetaminophen (NORCO) 5-325 mg per tablet Take 1 tablet by mouth every 6 (six) hours as needed for Pain. 20 tablet 0    ibuprofen (ADVIL,MOTRIN) 600 MG tablet       ondansetron (ZOFRAN-ODT) 8 MG TbDL Take 1 tablet (8 mg total) by mouth every 8 (eight) hours as needed (Nausea). 10 tablet 0    prenatal 25/iron fum/folic/dha (PRENATAL-1 ORAL) Take by mouth.       No current facility-administered medications for this visit.       ALLERGIES:     Review of patient's allergies indicates:   Allergen Reactions    Fish containing products Hives       PHYSICAL EXAMINATION:   General:  This is a well appearing female with appropriate speech, affect and gait.     Breast:  right breast incision clean, dry, and intact, cellulitic changes improving, penrose in place,  serosanguinous drainage to dressing    ASSESSMENT:   The patient has had an uneventful postoperative course.    PLAN:   1. Return Tuesday for re-evaluation  2. Cultures pending  3. Continue compression bra and be mindful of drain.      Angie Henry M.D.

## 2023-10-16 LAB
BACTERIA SPEC AEROBE CULT: ABNORMAL
BACTERIA SPEC AEROBE CULT: ABNORMAL

## 2023-10-17 ENCOUNTER — PATIENT MESSAGE (OUTPATIENT)
Dept: SURGERY | Facility: CLINIC | Age: 33
End: 2023-10-17

## 2023-10-17 ENCOUNTER — OFFICE VISIT (OUTPATIENT)
Dept: SURGERY | Facility: CLINIC | Age: 33
End: 2023-10-17
Payer: COMMERCIAL

## 2023-10-17 DIAGNOSIS — O91.13 ABSCESS OF RIGHT BREAST ASSOCIATED WITH LACTATION: Primary | ICD-10-CM

## 2023-10-17 LAB — BACTERIA SPEC ANAEROBE CULT: NORMAL

## 2023-10-17 PROCEDURE — 99999 PR PBB SHADOW E&M-EST. PATIENT-LVL I: ICD-10-PCS | Mod: PBBFAC,,, | Performed by: SURGERY

## 2023-10-17 PROCEDURE — 99999 PR PBB SHADOW E&M-EST. PATIENT-LVL I: CPT | Mod: PBBFAC,,, | Performed by: SURGERY

## 2023-10-17 PROCEDURE — 3044F PR MOST RECENT HEMOGLOBIN A1C LEVEL <7.0%: ICD-10-PCS | Mod: CPTII,S$GLB,, | Performed by: SURGERY

## 2023-10-17 PROCEDURE — 99024 POSTOP FOLLOW-UP VISIT: CPT | Mod: S$GLB,,, | Performed by: SURGERY

## 2023-10-17 PROCEDURE — 99024 PR POST-OP FOLLOW-UP VISIT: ICD-10-PCS | Mod: S$GLB,,, | Performed by: SURGERY

## 2023-10-17 PROCEDURE — 3044F HG A1C LEVEL LT 7.0%: CPT | Mod: CPTII,S$GLB,, | Performed by: SURGERY

## 2023-10-17 RX ORDER — CLINDAMYCIN HYDROCHLORIDE 300 MG/1
300 CAPSULE ORAL EVERY 6 HOURS
Qty: 28 CAPSULE | Refills: 0 | Status: SHIPPED | OUTPATIENT
Start: 2023-10-17 | End: 2023-10-24

## 2023-10-17 NOTE — PROGRESS NOTES
Breast Surgical Oncology  Post-operative Visit      REFERRING PHYSICIAN:  Mary, Primary Doctor    Date of Service: 10/17/2023    DIAGNOSIS:   This is a 33 y.o. female with right breast abscess    TREATMENT SUMMARY:   The patient is status post right mastotomy and drainage of abscess on 10/11/23.     INTERVAL HISTORY:   Anabella Scott comes in for a post-op check.  She denies fever, chills, chest pain or shortness of breath.  Her pain is well controlled.  Penrose drain is in place. She reports saturation of gauze once daily - drainage changed to serous. She has had increased tissue swelling since Friday. She continues to breast feed predominantly from the contralateral breast.     MEDICATIONS:     Current Outpatient Medications   Medication Sig Dispense Refill    aspirin (ECOTRIN) 81 MG EC tablet Take 81 mg by mouth once daily.      clindamycin (CLEOCIN) 300 MG capsule Take 1 capsule (300 mg total) by mouth every 6 (six) hours. for 7 days 28 capsule 0    estradioL (ESTRACE) 0.01 % (0.1 mg/gram) vaginal cream Place 1 g vaginally once daily. 30 g 1    ferrous sulfate 325 (65 FE) MG EC tablet Take 1 tablet (325 mg total) by mouth 2 (two) times daily. 60 tablet 10    HYDROcodone-acetaminophen (NORCO) 5-325 mg per tablet Take 1 tablet by mouth every 6 (six) hours as needed for Pain. 20 tablet 0    ibuprofen (ADVIL,MOTRIN) 600 MG tablet       ondansetron (ZOFRAN-ODT) 8 MG TbDL Take 1 tablet (8 mg total) by mouth every 8 (eight) hours as needed (Nausea). 10 tablet 0    prenatal 25/iron fum/folic/dha (PRENATAL-1 ORAL) Take by mouth.       No current facility-administered medications for this visit.       ALLERGIES:     Review of patient's allergies indicates:   Allergen Reactions    Fish containing products Hives       PHYSICAL EXAMINATION:   General:  This is a well appearing female with appropriate speech, affect and gait.     Breast:  right breast incision clean, dry, and intact, cellulitic changes improving but increased  inflammation of the tissues today compared to Friday, penrose in place, serous drainage to dressing    ASSESSMENT:   The patient has had an uneventful postoperative course.    PLAN:   1. Return in 1 week  2. Cultures MRSA, additional clindamycin given  3. Continue compression bra, drain removed today  4. Lactation contacted to schedule a follow up.       Angie Henry M.D.

## 2023-10-19 ENCOUNTER — PATIENT MESSAGE (OUTPATIENT)
Dept: LACTATION | Facility: CLINIC | Age: 33
End: 2023-10-19

## 2023-10-23 ENCOUNTER — OFFICE VISIT (OUTPATIENT)
Dept: SURGERY | Facility: CLINIC | Age: 33
End: 2023-10-23
Payer: COMMERCIAL

## 2023-10-23 DIAGNOSIS — O91.13 ABSCESS OF RIGHT BREAST ASSOCIATED WITH LACTATION: Primary | ICD-10-CM

## 2023-10-23 PROCEDURE — 99024 PR POST-OP FOLLOW-UP VISIT: ICD-10-PCS | Mod: S$GLB,,, | Performed by: SURGERY

## 2023-10-23 PROCEDURE — 3044F HG A1C LEVEL LT 7.0%: CPT | Mod: CPTII,S$GLB,, | Performed by: SURGERY

## 2023-10-23 PROCEDURE — 3044F PR MOST RECENT HEMOGLOBIN A1C LEVEL <7.0%: ICD-10-PCS | Mod: CPTII,S$GLB,, | Performed by: SURGERY

## 2023-10-23 PROCEDURE — 99024 POSTOP FOLLOW-UP VISIT: CPT | Mod: S$GLB,,, | Performed by: SURGERY

## 2023-10-23 NOTE — PROGRESS NOTES
Breast Surgical Oncology  Post-operative Visit      REFERRING PHYSICIAN:  Mary, Primary Doctor    Date of Service: 10/23/2023    DIAGNOSIS:   This is a 33 y.o. female with right breast abscess    TREATMENT SUMMARY:   The patient is status post right mastotomy and drainage of abscess on 10/11/23.     INTERVAL HISTORY:   Anabella Scott comes in for a post-op check.  She denies fever, chills, chest pain or shortness of breath.  Her pain is well controlled.  Penrose drain is in place. She reports saturation of gauze once daily - drainage changed to serous. She has had increased tissue swelling since Friday. She continues to breast feed predominantly from the contralateral breast.     MEDICATIONS:     Current Outpatient Medications   Medication Sig Dispense Refill    aspirin (ECOTRIN) 81 MG EC tablet Take 81 mg by mouth once daily.      clindamycin (CLEOCIN) 300 MG capsule Take 1 capsule (300 mg total) by mouth every 6 (six) hours. for 7 days 28 capsule 0    estradioL (ESTRACE) 0.01 % (0.1 mg/gram) vaginal cream Place 1 g vaginally once daily. 30 g 1    ferrous sulfate 325 (65 FE) MG EC tablet Take 1 tablet (325 mg total) by mouth 2 (two) times daily. 60 tablet 10    HYDROcodone-acetaminophen (NORCO) 5-325 mg per tablet Take 1 tablet by mouth every 6 (six) hours as needed for Pain. 20 tablet 0    ibuprofen (ADVIL,MOTRIN) 600 MG tablet       ondansetron (ZOFRAN-ODT) 8 MG TbDL Take 1 tablet (8 mg total) by mouth every 8 (eight) hours as needed (Nausea). 10 tablet 0    prenatal 25/iron fum/folic/dha (PRENATAL-1 ORAL) Take by mouth.       No current facility-administered medications for this visit.       ALLERGIES:     Review of patient's allergies indicates:   Allergen Reactions    Fish containing products Hives       PHYSICAL EXAMINATION:   General:  This is a well appearing female with appropriate speech, affect and gait.     Breast:  right breast incision clean, dry, and intact, cellulitis resolved    ASSESSMENT:   The  patient has had an uneventful postoperative course.    PLAN:   1. Return in 1 month for re-evaluation  2. Continue pumping as instructed by lara Henry M.D.

## 2023-11-01 ENCOUNTER — PATIENT MESSAGE (OUTPATIENT)
Dept: SURGERY | Facility: CLINIC | Age: 33
End: 2023-11-01
Payer: COMMERCIAL

## 2023-11-24 ENCOUNTER — TELEPHONE (OUTPATIENT)
Dept: SURGERY | Facility: CLINIC | Age: 33
End: 2023-11-24
Payer: COMMERCIAL

## 2023-11-24 ENCOUNTER — OFFICE VISIT (OUTPATIENT)
Dept: SURGERY | Facility: CLINIC | Age: 33
End: 2023-11-24
Payer: COMMERCIAL

## 2023-11-24 DIAGNOSIS — O91.13 ABSCESS OF RIGHT BREAST ASSOCIATED WITH LACTATION: Primary | ICD-10-CM

## 2023-11-24 PROCEDURE — 3044F PR MOST RECENT HEMOGLOBIN A1C LEVEL <7.0%: ICD-10-PCS | Mod: CPTII,S$GLB,, | Performed by: SURGERY

## 2023-11-24 PROCEDURE — 99024 PR POST-OP FOLLOW-UP VISIT: ICD-10-PCS | Mod: S$GLB,,, | Performed by: SURGERY

## 2023-11-24 PROCEDURE — 99024 POSTOP FOLLOW-UP VISIT: CPT | Mod: S$GLB,,, | Performed by: SURGERY

## 2023-11-24 PROCEDURE — 3044F HG A1C LEVEL LT 7.0%: CPT | Mod: CPTII,S$GLB,, | Performed by: SURGERY

## 2023-11-24 NOTE — PROGRESS NOTES
Breast Surgical Oncology  Post-operative Visit      REFERRING PHYSICIAN:  Mary Primary Doctor    Date of Service: 11/24/2023    DIAGNOSIS:   This is a 33 y.o. female with right breast abscess    TREATMENT SUMMARY:   The patient is status post right mastotomy and drainage of abscess on 10/11/23.     INTERVAL HISTORY:   Anabella Scott comes in for a post-op check.  She denies fever, chills, chest pain or shortness of breath.  She has continued to breastfeed without additional infectious events.     MEDICATIONS:     Current Outpatient Medications   Medication Sig Dispense Refill    aspirin (ECOTRIN) 81 MG EC tablet Take 81 mg by mouth once daily.      estradioL (ESTRACE) 0.01 % (0.1 mg/gram) vaginal cream Place 1 g vaginally once daily. 30 g 1    ferrous sulfate 325 (65 FE) MG EC tablet Take 1 tablet (325 mg total) by mouth 2 (two) times daily. 60 tablet 10    HYDROcodone-acetaminophen (NORCO) 5-325 mg per tablet Take 1 tablet by mouth every 6 (six) hours as needed for Pain. 20 tablet 0    ibuprofen (ADVIL,MOTRIN) 600 MG tablet       ondansetron (ZOFRAN-ODT) 8 MG TbDL Take 1 tablet (8 mg total) by mouth every 8 (eight) hours as needed (Nausea). 10 tablet 0    prenatal 25/iron fum/folic/dha (PRENATAL-1 ORAL) Take by mouth.       No current facility-administered medications for this visit.       ALLERGIES:     Review of patient's allergies indicates:   Allergen Reactions    Fish containing products Hives       PHYSICAL EXAMINATION:   General:  This is a well appearing female with appropriate speech, affect and gait.     Breast:  right breast incision clean, dry, and intact    ASSESSMENT:   The patient has had an uneventful postoperative course.    PLAN:   She has healed well. Return as needed      Angie Henry M.D.

## (undated) DEVICE — DRAIN JP STD PENROSE 1/4X12IN

## (undated) DEVICE — GLOVE SURGICAL LATEX SZ 7

## (undated) DEVICE — PAD ABD 8X10 STERILE

## (undated) DEVICE — SYR 10CC LUER LOCK

## (undated) DEVICE — SWAB CULTURETTE II DUAL

## (undated) DEVICE — NDL ECLIPSE SAFETY 18GX1-1/2IN

## (undated) DEVICE — DRAPE LAP T SHT W/ INSTR PAD

## (undated) DEVICE — SPONGE DERMACEA GAUZE 4X4

## (undated) DEVICE — VEST SURGICAL SZ 2

## (undated) DEVICE — MANIFOLD 4 PORT

## (undated) DEVICE — TUBE SPEC COLL&TRANSPORT 11ML

## (undated) DEVICE — TOWEL OR DISP STRL BLUE 4/PK

## (undated) DEVICE — ELECTRODE REM PLYHSV RETURN 9

## (undated) DEVICE — SPONGE LAP 18X18 PREWASHED

## (undated) DEVICE — PACK BASIC SETUP SC BR

## (undated) DEVICE — SUT 2/0 30IN SILK BLK BRAI

## (undated) DEVICE — SOL 9P NACL IRR PIC IL

## (undated) DEVICE — TUBING SUCTION STRAIGHT .25X20